# Patient Record
Sex: FEMALE | Race: BLACK OR AFRICAN AMERICAN | NOT HISPANIC OR LATINO | URBAN - METROPOLITAN AREA
[De-identification: names, ages, dates, MRNs, and addresses within clinical notes are randomized per-mention and may not be internally consistent; named-entity substitution may affect disease eponyms.]

---

## 2024-10-22 ENCOUNTER — EMERGENCY (EMERGENCY)
Facility: HOSPITAL | Age: 24
LOS: 1 days | Discharge: HOME | End: 2024-10-22
Admitting: STUDENT IN AN ORGANIZED HEALTH CARE EDUCATION/TRAINING PROGRAM
Payer: COMMERCIAL

## 2024-10-22 VITALS
RESPIRATION RATE: 18 BRPM | DIASTOLIC BLOOD PRESSURE: 74 MMHG | SYSTOLIC BLOOD PRESSURE: 131 MMHG | HEART RATE: 81 BPM | OXYGEN SATURATION: 98 %

## 2024-10-22 VITALS
TEMPERATURE: 98 F | HEIGHT: 65 IN | WEIGHT: 149.91 LBS | SYSTOLIC BLOOD PRESSURE: 125 MMHG | DIASTOLIC BLOOD PRESSURE: 78 MMHG | OXYGEN SATURATION: 98 % | RESPIRATION RATE: 16 BRPM | HEART RATE: 79 BPM

## 2024-10-22 PROCEDURE — 99283 EMERGENCY DEPT VISIT LOW MDM: CPT

## 2024-10-22 PROCEDURE — 99285 EMERGENCY DEPT VISIT HI MDM: CPT

## 2024-10-22 RX ADMIN — Medication 400 MILLIGRAM(S): at 14:31

## 2024-10-22 NOTE — ED PROVIDER NOTE - NSFOLLOWUPINSTRUCTIONS_ED_ALL_ED_FT
You can use ibuprofen if needed for pain, take as directed.  Follow up with Employee Health.  =============================  Burn Care, Adult  A hand with a reddened and blistered area from a burn.  A burn is an injury to the skin or the tissues under the skin. It may be caused by a fire, hot liquid or steam, chemicals, electricity, or the sun. There are three types of burns:  First degree. These burns are similar to a sunburn. They may cause your skin to be red, slightly swollen, and tender. They may be treated at home.  Second degree. These burns are very painful. They may cause your skin to turn very red, swell, leak fluid, look shiny, and blister. In many cases, these burns may be treated at home. If they cover your hands, feet, face, or genitals, get help from a health care provider.  Third degree. These burns are the most severe. They may not be painful, but you may feel pain around the edges of them. Your skin may turn white or black and may look charred, dry, and leathery. These burns cause lasting damage. If you get a third-degree burn, get help right away.  Treatment will depend on the type of burn you have. Taking care of your burn can help to prevent pain and infection. It can also help the burn heal more quickly.    How to care for a first-degree burn  Right after the burn:    Rinse or soak the burn under cool water for 5 minutes or more.  Put a cool, wet cloth (cool compress) on your skin. This may help with pain.  Do not put ice on your burn. This can cause more damage.  Caring for the burn    Clean and care for the burn as told by your provider. You may be told to:  Use mild soap and water to clean the area.  Use a clean cloth to pat the burned area dry after cleaning it. Do not rub or scrub the burn.  Put lotion or aloe vera gel on your skin.  How to care for a second-degree burn  Right after the burn:    Rinse or soak the burn under cool water. Do this for 5–10 minutes.  Do not put ice on your burn. This can cause more damage.  Take off any jewelry or clothing near the burn.  Lightly cover the burn with a clean cloth.  Caring for the burn    Clean and care for the burn as told by your provider. You may be told to:  Clean or rinse out the burned area.  Put a cream or ointment on the burn. You may need to use an antibiotic cream that has silver in it. This can kill bacteria.  Place a germ-free (sterile) dressing over the burn. A dressing is a bandage that is put over a burn to help it heal.  Raise (elevate) the injured area above the level of your heart while you are sitting or lying down.  How to care for a third-degree burn  Right after the burn:    Lightly cover the burn with a clean, dry cloth.  Get help right away. You may need to:  Stay in the hospital.  Have surgery to remove burned tissue or get a skin graft.  Get fluids through an IV.  Caring for the burn    Clean and care for the burn as told by your provider. You may be told to:  Clean or rinse out the burn.  Put a cream or ointment on the burn.  Put a sterile dressing in the burned area (packing).  Put a sterile dressing over the burn.  Use pressure (compression) dressings.  Elevate the injured area above the level of your heart while you are sitting or lying down.  Wear splints or immobilizers as told by your provider.  Do exercises as told by your provider.  Rest as told by your provider. Do not do sports or other physical activities until your provider says that you can.  How to prevent infection when caring for a burn  Washing hands with soap and water.  Take these steps to prevent infection and more damage to the tissue. Make sure you:  Wash your hands with soap and water for at least 20 seconds before and after you care for your burn. If soap and water are not available, use hand .  Wear clean gloves as told by your provider.  Do not put butter, oil, toothpaste, or other home remedies on the burn.  Do not scratch or pick at the burn.  Do not break any blisters.  Do not peel the skin.  Do not rub your burn, even when cleaning it.  Check your burn every day for signs of infection. Check for:  More redness, swelling, or pain.  Warmth.  Pus or a bad smell.  Red streaks around the burn.  Follow these instructions at home  Medicines    Take over-the-counter and prescription medicines only as told by your provider.  If you were prescribed antibiotics, take or apply them as told by your provider. Do not stop using the antibiotic even if you start to feel better.  General instructions    Do not use any products that contain nicotine or tobacco. These products include cigarettes, chewing tobacco, and vaping devices, such as e-cigarettes. If you need help quitting, ask your provider.  Drink enough fluid to keep your pee (urine) pale yellow.  Protect your burn from the sun.  Contact a health care provider if:  Your burn does not get better, or it gets worse.  You have any signs of infection.  Your burn starts to look different or gets black or red spots.  Your pain does not get better with medicine.  You have anxiety or depression after the injury.  Get help right away if:  You have red streaks near the burn.  You are in severe pain.  This information is not intended to replace advice given to you by your health care provider. Make sure you discuss any questions you have with your health care provider.

## 2024-10-22 NOTE — ED PROVIDER NOTE - PHYSICAL EXAMINATION
CONSTITUTIONAL: NAD   SKIN: Normal color and turgor.  No visible erythema, blistering, or other abnormality of affected area (left buttock, posterior thigh).  Chaperoned by ALYSSA Mcbride.  HEAD: NC/AT.  EYES: Conjunctiva clear. Anicteric sclera.  ENT: Airway clear. Normal voice. MMM.  RESPIRATORY:  Normal respiratory rate and effort.  CARDIOVASCULAR:  RRR   GI:  Abdomen soft, nontender.    MSK: Neck supple.  No LE edema or calf tenderness. No joint swelling or ROM limitation.  NEURO: Alert, clear mental status.  Speech clear. No focal deficits. Gait steady.

## 2024-10-22 NOTE — ED ADULT TRIAGE NOTE - CHIEF COMPLAINT QUOTE
employee here in the kitchen, c/o left buttocks burn from hot salmon oil accidentally spilled on her just PTA.

## 2024-10-22 NOTE — ED PROVIDER NOTE - CLINICAL SUMMARY MEDICAL DECISION MAKING FREE TEXT BOX
Pt reports hot salmon oil burn to left buttock, occurred 1-2 hrs ago.   Well-appearing, has no signs of burn or other abnormality on exam, clothing intact.  Not pregnant.   PLan; pain control; follow up Employee Health.

## 2024-10-22 NOTE — ED PROVIDER NOTE - PATIENT PORTAL LINK FT
You can access the FollowMyHealth Patient Portal offered by [facility] by registering at the following website: [email or web address]. By joining [employer]’s FollowMyHealth portal, you will also be able to view your health information using other applications (apps) compatible with our system.

## 2024-10-22 NOTE — ED ADULT NURSE NOTE - AVIAN FLU SYMPTOMS
Lab Frequency Next Occurrence   CBC WITH DIFFERENTIAL WITH PLATELET Once 07/71/1977   IRON AND TIBC Once 05/19/2023     Letter mailed to patient with reminder of overdue testing
No
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
## 2024-10-24 DIAGNOSIS — T21.25XA BURN OF SECOND DEGREE OF BUTTOCK, INITIAL ENCOUNTER: ICD-10-CM

## 2024-10-24 DIAGNOSIS — T31.0 BURNS INVOLVING LESS THAN 10% OF BODY SURFACE: ICD-10-CM

## 2024-10-24 DIAGNOSIS — Z91.013 ALLERGY TO SEAFOOD: ICD-10-CM
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
## 2025-02-03 ENCOUNTER — EMERGENCY (EMERGENCY)
Facility: HOSPITAL | Age: 25
LOS: 1 days | Discharge: HOME | End: 2025-02-03
Admitting: EMERGENCY MEDICINE
Payer: COMMERCIAL

## 2025-02-03 VITALS
HEART RATE: 77 BPM | DIASTOLIC BLOOD PRESSURE: 78 MMHG | OXYGEN SATURATION: 98 % | RESPIRATION RATE: 18 BRPM | HEIGHT: 65 IN | TEMPERATURE: 98 F | SYSTOLIC BLOOD PRESSURE: 115 MMHG | WEIGHT: 149.91 LBS

## 2025-02-03 PROCEDURE — 99283 EMERGENCY DEPT VISIT LOW MDM: CPT

## 2025-02-03 RX ORDER — ACETAMINOPHEN 160 MG/5ML
2 SUSPENSION ORAL
Qty: 12 | Refills: 0
Start: 2025-02-03 | End: 2025-02-05

## 2025-02-03 RX ORDER — AMOXICILLIN AND CLAVULANATE POTASSIUM 200; 28.5 MG/5ML; MG/5ML
875 POWDER, FOR SUSPENSION ORAL
Qty: 14 | Refills: 0
Start: 2025-02-03 | End: 2025-02-09

## 2025-02-03 RX ORDER — AMOXICILLIN AND CLAVULANATE POTASSIUM 200; 28.5 MG/5ML; MG/5ML
1 POWDER, FOR SUSPENSION ORAL ONCE
Refills: 0 | Status: COMPLETED | OUTPATIENT
Start: 2025-02-03 | End: 2025-02-03

## 2025-02-03 RX ORDER — ACETAMINOPHEN 160 MG/5ML
650 SUSPENSION ORAL ONCE
Refills: 0 | Status: COMPLETED | OUTPATIENT
Start: 2025-02-03 | End: 2025-02-03

## 2025-02-03 RX ORDER — AMOXICILLIN AND CLAVULANATE POTASSIUM 200; 28.5 MG/5ML; MG/5ML
875 POWDER, FOR SUSPENSION ORAL
Qty: 14 | Refills: 0
Start: 2025-02-03 | End: 2025-02-14

## 2025-02-03 RX ADMIN — ACETAMINOPHEN 650 MILLIGRAM(S): 160 SUSPENSION ORAL at 12:19

## 2025-02-03 RX ADMIN — AMOXICILLIN AND CLAVULANATE POTASSIUM 1 TABLET(S): 200; 28.5 POWDER, FOR SUSPENSION ORAL at 12:19

## 2025-02-03 NOTE — ED ADULT NURSE NOTE - NS ED NURSE LEVEL OF CONSCIOUSNESS SPEECH
Speaking Coherently Consent was obtained from the patient. The risks and benefits to therapy were discussed in detail. Specifically, the risks of infection, scarring, bleeding, prolonged wound healing, incomplete removal, allergy to anesthesia, nerve injury and recurrence were addressed. Prior to the procedure, the treatment site was clearly identified and confirmed by the patient. All components of Universal Protocol/PAUSE Rule completed.

## 2025-02-03 NOTE — ED PROVIDER NOTE - OBJECTIVE STATEMENT
Patient is a 24-year-old female, presents to ED complaining of throat pain x 2 days.  Patient states she has a history of strep pharyngitis, and tonsillitis, and states that she is having pain in her lymph nodes.  Patient also admits to a slight sore throat.  Patient denies fever, nausea, vomiting, shortness of breath or cough.

## 2025-02-03 NOTE — ED ADULT TRIAGE NOTE - CHIEF COMPLAINT QUOTE
Pt presents to ED her e for throat pain x 2 days. Denies fever or chills. Pt &Ox4, NAD and conversive in full sentences.

## 2025-02-03 NOTE — ED PROVIDER NOTE - NSFOLLOWUPINSTRUCTIONS_ED_ALL_ED_FT
Strep Throat    WHAT YOU NEED TO KNOW:    What is strep throat? Strep throat is a throat infection caused by bacteria. The bacteria are easily spread from person to person.    What are the signs and symptoms of strep throat?    Sore, red, and swollen throat    Fever and headache    Upset stomach, abdominal pain, or vomiting    White or yellow patches or blisters in the back of your throat    Tender, swollen lumps on the sides of your neck or jaw    Throat pain when you swallow  Strep Throat  How is strep throat diagnosed? Your healthcare provider will swab the back of your throat to test for strep bacteria. You may get the results in minutes or the swab may be sent to a lab.    How is strep throat treated? Treatment may include any of the following:    Antibiotics treat the infection. You should feel better within 2 to 3 days after you start antibiotics. Take all prescribed doses, even if you start to feel better sooner. You may return to work or school 24 hours after you start antibiotics.    NSAIDs help decrease swelling and pain or fever. This medicine is available with or without a doctor's order. NSAIDs can cause stomach bleeding or kidney problems in certain people. If you take blood thinner medicine, always ask your healthcare provider if NSAIDs are safe for you. Always read the medicine label and follow directions.    Acetaminophen decreases pain and fever. It is available without a doctor's order. Ask how much to take and how often to take it. Follow directions. Read the labels of all other medicines you are using to see if they also contain acetaminophen, or ask your doctor or pharmacist. Acetaminophen can cause liver damage if not taken correctly.  How can I manage my symptoms?    Use throat lozenges or suck on hard candy. Lozenges and hard candy can help decrease throat pain.    Drink plenty of liquids. Liquids will help soothe your throat. Ask your healthcare provider how much liquid to drink each day. Warm liquids include hot chocolate, tea, and soup. Frozen liquids include ice pops. Do not have acidic drinks such as orange juice, grapefruit juice, or lemonade. Acidic drinks can make your throat pain worse.    Gargle with salt water up to 4 times each day. Put ¼ teaspoon of salt in 1 cup of warm water. Gargle the salt water for 10 to 15 seconds. Then spit it out. Do not swallow the salt water.    Do not smoke. Nicotine and other chemicals in cigarettes and cigars can cause lung damage and make your symptoms worse. Ask your healthcare provider for information if you currently smoke and need help to quit. E-cigarettes or smokeless tobacco still contain nicotine. Talk to your healthcare provider before you use these products.  How do I prevent strep throat?    Wash your hands often. Use soap and water. Wash your hands after you use the bathroom, change a child's diapers, or sneeze. Wash your hands before you prepare or eat food. Use an alcohol-based hand  if soap and water are not available.  Handwashing      Do not share food or drinks. Strep bacteria can stay on dishes and in straws for 24 hours or longer. You may pass the bacteria to others by sharing.    Replace your toothbrush 24 hours after you start antibiotics. Strep bacteria can stay on a toothbrush for several days. You may get strep throat again or infect someone else if the toothbrush is not replaced.  Call your local emergency number (911 in the ) if:    You have trouble breathing.    When should I seek immediate care?    You have new symptoms like a bad headache, stiff neck, or vomiting.    You are drooling because you cannot swallow.  When should I call my doctor?    You have a fever.    You have a rash or ear pain.    You have green, yellow-brown, or bloody mucus when you cough or blow your nose.    You are not able to drink anything.    You have questions or concerns about your condition or care.  CARE AGREEMENT:    You have the right to help plan your care. Learn about your health condition and how it may be treated. Discuss treatment options with your healthcare providers to decide what care you want to receive. You always have the right to refuse treatment.    © [employer] 1973, 2025

## 2025-02-03 NOTE — ED PROVIDER NOTE - PATIENT PORTAL LINK FT
You can access the FollowMyHealth Patient Portal offered by [facility] by registering at the following website: [email or web address]. By joining [employer]’s FollowMyHealth portal, you will also be able to view your health information using other applications (apps) compatible with our system.

## 2025-02-03 NOTE — ED PROVIDER NOTE - CLINICAL SUMMARY MEDICAL DECISION MAKING FREE TEXT BOX
Patient is a 24-year-old female, who presented to ED complaining of a sore throat timesx 2 days.  Patient was seen and examined and had cervical lymphadenopathy, and a sore throat representative of strep pharyngitis.  Patient was prescribed Augmentin and Tylenol and instructed to follow-up with her primary care provider tomorrow for further evaluation and management.

## 2025-02-05 DIAGNOSIS — R07.0 PAIN IN THROAT: ICD-10-CM

## 2025-02-05 DIAGNOSIS — J02.0 STREPTOCOCCAL PHARYNGITIS: ICD-10-CM

## 2025-02-05 DIAGNOSIS — R59.0 LOCALIZED ENLARGED LYMPH NODES: ICD-10-CM

## 2025-02-08 ENCOUNTER — EMERGENCY (EMERGENCY)
Facility: HOSPITAL | Age: 25
LOS: 1 days | Discharge: HOME | End: 2025-02-08
Admitting: EMERGENCY MEDICINE
Payer: COMMERCIAL

## 2025-02-08 VITALS
SYSTOLIC BLOOD PRESSURE: 111 MMHG | OXYGEN SATURATION: 100 % | RESPIRATION RATE: 18 BRPM | TEMPERATURE: 97 F | HEART RATE: 72 BPM | HEIGHT: 65 IN | WEIGHT: 149.91 LBS | DIASTOLIC BLOOD PRESSURE: 74 MMHG

## 2025-02-08 PROCEDURE — 99283 EMERGENCY DEPT VISIT LOW MDM: CPT

## 2025-02-08 NOTE — ED PROVIDER NOTE - OBJECTIVE STATEMENT
25 y/o female w/ no sig pmh p/w sore throat and swollen lymph nodes x approx 1 wk.  Was seen for same on 2/3/25 and prescribed augmentin, but did not pick it up.  States sx mostly the same, but slightly worse.  Denies f/c, cough, neck stiffness, cp, sob, n/v/d.  Has hx tonsillitis in past.

## 2025-02-08 NOTE — ED ADULT NURSE NOTE - SUICIDE SCREENING QUESTION 2
"Chief Complaint   Patient presents with     Leg Swelling     left leg/foot swelling with pain for two days - reports hx of blood clot in leg leg - currently taking Warfrin.       Initial /75  Pulse 72  Temp 97.1  F (36.2  C) (Oral)  Wt 176 lb (79.8 kg)  BMI 31.68 kg/m2 Estimated body mass index is 31.68 kg/(m^2) as calculated from the following:    Height as of 4/27/17: 5' 2.5\" (1.588 m).    Weight as of this encounter: 176 lb (79.8 kg).  Medication Reconciliation: complete    "
No

## 2025-02-08 NOTE — ED ADULT NURSE NOTE - OBJECTIVE STATEMENT
The patient is a 24y Female complaining of  throat pain and swollen lymph nodes x 1 week, seen recently at [facility] ED for similar cc. Pt states pain is worse and radiating to the back of her throat. Pt denies SOB, F/C, N/V, trouble swallowing.

## 2025-02-08 NOTE — ED PROVIDER NOTE - NSFOLLOWUPINSTRUCTIONS_ED_ALL_ED_FT
Thank you for visiting [facility] Emergency Department.      We saw you today for sore throat.  Please take antibiotics as prescribed.    PAIN CONTROL:   You may take ibuprofen (Motrin, Advil) 600 mg (3 regular tablets) every 6 hours as needed for pain.  Please take with food.  Stop taking if you develop abdominal pain, dark/ bloody stools.  Do not mix with other NSAIDS (ie. Naproxen, Aleve, Celecoxib).  You may also take acetaminophen (Tylenol) 650-975mg (2-3 regular tablets) or 500-1000mg (1-2 extra strength tablets) every 6 hours as needed for pain.  Do not exceed 4000 mg in 1 day. These medications may be bought over the counter.    If your pain does not improve or worsens despite these measures, you should seek medical attention and/or may need to follow up with an ENT specialist.    Please know that no emergency visit is complete without follow-up with your primary care provider in 1 week.  Please bring copies of all discharge papers and results and show to your doctor.      Please continue taking all previous medications as instructed unless we discussed otherwise.     I appreciated your patience and hope you feel better soon.     Return to ER immediately if you develop fevers, chills, chest pain, shortness of breath, worsening and/or any concerning symptoms.

## 2025-02-08 NOTE — ED PROVIDER NOTE - PHYSICAL EXAMINATION
CONSTITUTIONAL: Awake, alert.  Nontoxic, no acute distress.    HEAD: Normocephalic, atraumatic.    EYES: Conjunctivae clear without exudates or hemorrhage. Sclera is non-icteric.    ENT:   Ears: External ear normal appearing without tenderness, ear canal clear without discharge or erythema, TM normal in appearance with normal landmarks and cone of light.  No mastoid tenderness, swelling, erythema.  Nose: Normal appearing nose, nasal mucosa is pink and moist. The nasal septum is midline, no septal hematoma. Nares are patent bilaterally.  Throat: Oral mucosa is pink and moist with good dentition. Tongue normal in appearance without lesions and with good symmetrical movement. No buccal nodules or lesions are noted. The pharynx is normal in appearance without tonsillar swelling or exudates.  Uvula midline.  No trismus.  Minimal submandibular lymphadenopathy     NECK: supple, trachea midline. FROM     HEART:  Normal rate, regular rhythm.      LUNGS:  No acute respiratory distress.  Non-tachypneic and non-labored.      MUSCULOSKELETAL:  Moving all extremities without issue.    SKIN: Skin in warm, dry and intact without rashes or lesions.  Appropriate color for ethnicity.    NEUROLOGICAL:  Patient is alert, oriented x person, place and time.    PSYCH: Appropriate mood and affect. Good judgment and insight.

## 2025-02-08 NOTE — ED ADULT TRIAGE NOTE - CHIEF COMPLAINT QUOTE
Pt presents to ED c.o worsening sore throat and swollen l7ymph nodes, pt was seen here in the ED for the CC days ago and was advised for tonsillectomy. Pt A&Ox4, NAD and conversive in full sentences and ambulatory. No airway compromise.

## 2025-02-08 NOTE — ED PROVIDER NOTE - PATIENT PORTAL LINK FT
You can access the FollowMyHealth Patient Portal offered by [facility] by registering at the following website: [email or web address]. By joining [employer]’s FollowMyHealth portal, you will also be able to view your health information using other applications (apps) compatible with our system.

## 2025-02-08 NOTE — ED PROVIDER NOTE - CLINICAL SUMMARY MEDICAL DECISION MAKING FREE TEXT BOX
25 y/o female w/ no sig pmh p/w sore throat and swollen lymph nodes x approx 1 wk.  Was seen for same on 2/3/25 and prescribed augmentin, but did not pick it up.  States sx mostly the same, but slightly worse.  Denies f/c, cough, neck stiffness, cp, sob, n/v/d.  Has hx tonsillitis in past.  VSS  Exam grossly reassuring. minimal submandibular lymphadenopathy on exam  Suspect most likely viral pharyngitis, but was dx with presumed strep on 2/3, but did not  abx.  will resend/ advise pt to take.  supportive care.  close f/u with pmd and ent

## 2025-02-11 DIAGNOSIS — J02.9 ACUTE PHARYNGITIS, UNSPECIFIED: ICD-10-CM

## 2025-02-11 DIAGNOSIS — Z87.09 PERSONAL HISTORY OF OTHER DISEASES OF THE RESPIRATORY SYSTEM: ICD-10-CM

## 2025-02-11 DIAGNOSIS — R59.0 LOCALIZED ENLARGED LYMPH NODES: ICD-10-CM

## 2025-02-11 DIAGNOSIS — Z91.013 ALLERGY TO SEAFOOD: ICD-10-CM

## 2025-03-05 ENCOUNTER — EMERGENCY (EMERGENCY)
Facility: HOSPITAL | Age: 25
LOS: 1 days | Discharge: HOME | End: 2025-03-05
Admitting: EMERGENCY MEDICINE
Payer: COMMERCIAL

## 2025-03-05 VITALS
DIASTOLIC BLOOD PRESSURE: 76 MMHG | RESPIRATION RATE: 18 BRPM | TEMPERATURE: 98 F | WEIGHT: 154.98 LBS | OXYGEN SATURATION: 100 % | HEIGHT: 65 IN | SYSTOLIC BLOOD PRESSURE: 115 MMHG | HEART RATE: 66 BPM

## 2025-03-05 VITALS
HEART RATE: 73 BPM | RESPIRATION RATE: 18 BRPM | DIASTOLIC BLOOD PRESSURE: 83 MMHG | SYSTOLIC BLOOD PRESSURE: 123 MMHG | TEMPERATURE: 99 F | OXYGEN SATURATION: 100 %

## 2025-03-05 LAB
FLUAV AG NPH QL: SIGNIFICANT CHANGE UP
FLUBV AG NPH QL: SIGNIFICANT CHANGE UP
RSV RNA NPH QL NAA+NON-PROBE: SIGNIFICANT CHANGE UP
S PYO AG SPEC QL IA: NEGATIVE — SIGNIFICANT CHANGE UP
SARS-COV-2 RNA SPEC QL NAA+PROBE: SIGNIFICANT CHANGE UP

## 2025-03-05 PROCEDURE — 87081 CULTURE SCREEN ONLY: CPT

## 2025-03-05 PROCEDURE — 87880 STREP A ASSAY W/OPTIC: CPT

## 2025-03-05 PROCEDURE — 99284 EMERGENCY DEPT VISIT MOD MDM: CPT

## 2025-03-05 PROCEDURE — 87637 SARSCOV2&INF A&B&RSV AMP PRB: CPT

## 2025-03-05 PROCEDURE — 99283 EMERGENCY DEPT VISIT LOW MDM: CPT

## 2025-03-07 DIAGNOSIS — Z91.013 ALLERGY TO SEAFOOD: ICD-10-CM

## 2025-03-07 DIAGNOSIS — J06.9 ACUTE UPPER RESPIRATORY INFECTION, UNSPECIFIED: ICD-10-CM

## 2025-03-07 DIAGNOSIS — B97.89 OTHER VIRAL AGENTS AS THE CAUSE OF DISEASES CLASSIFIED ELSEWHERE: ICD-10-CM

## 2025-03-07 DIAGNOSIS — R05.8 OTHER SPECIFIED COUGH: ICD-10-CM

## 2025-03-25 PROBLEM — Z00.00 ENCOUNTER FOR PREVENTIVE HEALTH EXAMINATION: Status: ACTIVE | Noted: 2025-03-25

## 2025-03-27 ENCOUNTER — APPOINTMENT (OUTPATIENT)
Dept: INTERNAL MEDICINE | Facility: CLINIC | Age: 25
End: 2025-03-27

## 2025-04-08 ENCOUNTER — APPOINTMENT (OUTPATIENT)
Dept: INTERNAL MEDICINE | Facility: CLINIC | Age: 25
End: 2025-04-08
Payer: COMMERCIAL

## 2025-04-08 ENCOUNTER — OUTPATIENT (OUTPATIENT)
Dept: OUTPATIENT SERVICES | Facility: HOSPITAL | Age: 25
LOS: 1 days | End: 2025-04-08

## 2025-04-08 VITALS
BODY MASS INDEX: 27.32 KG/M2 | HEART RATE: 78 BPM | OXYGEN SATURATION: 98 % | WEIGHT: 164 LBS | DIASTOLIC BLOOD PRESSURE: 73 MMHG | SYSTOLIC BLOOD PRESSURE: 117 MMHG | HEIGHT: 65 IN | TEMPERATURE: 97.5 F

## 2025-04-08 DIAGNOSIS — Z87.09 PERSONAL HISTORY OF OTHER DISEASES OF THE RESPIRATORY SYSTEM: ICD-10-CM

## 2025-04-08 DIAGNOSIS — Z12.4 ENCOUNTER FOR SCREENING FOR MALIGNANT NEOPLASM OF CERVIX: ICD-10-CM

## 2025-04-08 DIAGNOSIS — Z00.00 ENCOUNTER FOR GENERAL ADULT MEDICAL EXAMINATION W/OUT ABNORMAL FINDINGS: ICD-10-CM

## 2025-04-08 DIAGNOSIS — J45.909 UNSPECIFIED ASTHMA, UNCOMPLICATED: ICD-10-CM

## 2025-04-08 DIAGNOSIS — R07.89 OTHER CHEST PAIN: ICD-10-CM

## 2025-04-08 DIAGNOSIS — J02.9 ACUTE PHARYNGITIS, UNSPECIFIED: ICD-10-CM

## 2025-04-08 PROCEDURE — G2211 COMPLEX E/M VISIT ADD ON: CPT

## 2025-04-08 PROCEDURE — 99204 OFFICE O/P NEW MOD 45 MIN: CPT

## 2025-04-08 RX ORDER — ALBUTEROL 90 MCG
90 AEROSOL (GRAM) INHALATION
Refills: 0 | Status: ACTIVE | COMMUNITY

## 2025-06-02 ENCOUNTER — EMERGENCY (EMERGENCY)
Facility: HOSPITAL | Age: 25
LOS: 1 days | End: 2025-06-02
Attending: STUDENT IN AN ORGANIZED HEALTH CARE EDUCATION/TRAINING PROGRAM | Admitting: STUDENT IN AN ORGANIZED HEALTH CARE EDUCATION/TRAINING PROGRAM
Payer: COMMERCIAL

## 2025-06-02 VITALS
SYSTOLIC BLOOD PRESSURE: 116 MMHG | WEIGHT: 164.91 LBS | HEART RATE: 76 BPM | OXYGEN SATURATION: 99 % | TEMPERATURE: 99 F | DIASTOLIC BLOOD PRESSURE: 78 MMHG | HEIGHT: 65 IN | RESPIRATION RATE: 16 BRPM

## 2025-06-02 DIAGNOSIS — Z91.013 ALLERGY TO SEAFOOD: ICD-10-CM

## 2025-06-02 DIAGNOSIS — M79.672 PAIN IN LEFT FOOT: ICD-10-CM

## 2025-06-02 PROCEDURE — 73620 X-RAY EXAM OF FOOT: CPT

## 2025-06-02 PROCEDURE — 99284 EMERGENCY DEPT VISIT MOD MDM: CPT

## 2025-06-02 PROCEDURE — 73620 X-RAY EXAM OF FOOT: CPT | Mod: 26,LT

## 2025-06-02 PROCEDURE — 99283 EMERGENCY DEPT VISIT LOW MDM: CPT | Mod: 25

## 2025-06-02 RX ORDER — IBUPROFEN 200 MG
400 TABLET ORAL ONCE
Refills: 0 | Status: COMPLETED | OUTPATIENT
Start: 2025-06-02 | End: 2025-06-02

## 2025-06-02 RX ADMIN — Medication 400 MILLIGRAM(S): at 14:06

## 2025-06-02 NOTE — ED PROVIDER NOTE - CARE PROVIDER_API CALL
Bashir Monterroso  Podiatric Medicine and Surgery  [address]  [place] [address]  Phone: [phone]  Fax: [phone]  Follow Up Time: 7-10 Days

## 2025-06-02 NOTE — ED PROVIDER NOTE - NSFOLLOWUPINSTRUCTIONS_ED_ALL_ED_FT
Please take Motrin as needed for discomfort perform gentle stretching and massage.  Please use shoes with good arch support and cushioning.  Please follow-up with the podiatrist if you have chronic or worsening issues.  Please come back to emergency room for any emergent concerns.  Your x-ray did not reveal any fractures or bony injuries.

## 2025-06-02 NOTE — ED PROVIDER NOTE - OBJECTIVE STATEMENT
Patient is a 4-year-old female no significant past medical history present emergency room with left foot pain.  Patient states for the past month intermittent pain near the top of the metatarsals.  Denies any injury twisting fevers wounds.  Worse with walking feels that she has been overusing her feet.  Denies any prior occurrences.  Concerned because the pain is so prolonged.

## 2025-06-02 NOTE — ED ADULT TRIAGE NOTE - CHIEF COMPLAINT QUOTE
L foot pain x "several weeks" without obvious initiating injury or trauma. Alert, oriented, and ambulatory without difficulty at time of triage. No further injury reported within the last 24 hours. No further symptoms reported.

## 2025-06-02 NOTE — ED PROVIDER NOTE - MUSCULOSKELETAL, MLM
Left foot minimal tenderness to palpation at the head of the third metatarsal no overlying skin changes intact pulses warm extremity no wounds or lacerations

## 2025-06-02 NOTE — ED PROVIDER NOTE - CLINICAL SUMMARY MEDICAL DECISION MAKING FREE TEXT BOX
Patient present Emergency Department with subacute left-sided foot pain.  Patient well-appearing in no acute distress no overlying skin changes no significant focal bony tenderness low suspicion for fracture or acute bony injury likely strain or ligamentous sprain.  Will treat discomfort obtain x-ray.  If reassuring can likely discharge patient home with instructions to follow-up with podiatry.

## 2025-06-02 NOTE — ED ADULT NURSE NOTE - CAS ELECT INFOMATION PROVIDED
Impression: Presence of intraocular lens: Z96.1. Plan: Lenses are doing well.   Will continue to monitor yearly for now
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 Plan: Retinas are both doing well.   RTC if any changes noted
DC instructions

## 2025-06-19 ENCOUNTER — EMERGENCY (EMERGENCY)
Facility: HOSPITAL | Age: 25
LOS: 1 days | End: 2025-06-19
Admitting: STUDENT IN AN ORGANIZED HEALTH CARE EDUCATION/TRAINING PROGRAM
Payer: COMMERCIAL

## 2025-06-19 VITALS
SYSTOLIC BLOOD PRESSURE: 113 MMHG | HEIGHT: 65 IN | RESPIRATION RATE: 17 BRPM | HEART RATE: 86 BPM | WEIGHT: 164.91 LBS | TEMPERATURE: 98 F | DIASTOLIC BLOOD PRESSURE: 65 MMHG | OXYGEN SATURATION: 98 %

## 2025-06-19 DIAGNOSIS — M25.511 PAIN IN RIGHT SHOULDER: ICD-10-CM

## 2025-06-19 DIAGNOSIS — Z91.013 ALLERGY TO SEAFOOD: ICD-10-CM
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
## 2025-06-19 PROCEDURE — 73030 X-RAY EXAM OF SHOULDER: CPT | Mod: 26,RT

## 2025-06-19 PROCEDURE — 99283 EMERGENCY DEPT VISIT LOW MDM: CPT | Mod: 25

## 2025-06-19 PROCEDURE — 99284 EMERGENCY DEPT VISIT MOD MDM: CPT

## 2025-06-19 PROCEDURE — 73030 X-RAY EXAM OF SHOULDER: CPT

## 2025-06-19 RX ORDER — IBUPROFEN 200 MG
600 TABLET ORAL ONCE
Refills: 0 | Status: COMPLETED | OUTPATIENT
Start: 2025-06-19 | End: 2025-06-19

## 2025-06-19 RX ADMIN — Medication 600 MILLIGRAM(S): at 10:23

## 2025-07-12 ENCOUNTER — EMERGENCY (EMERGENCY)
Facility: HOSPITAL | Age: 25
LOS: 1 days | End: 2025-07-12
Admitting: STUDENT IN AN ORGANIZED HEALTH CARE EDUCATION/TRAINING PROGRAM
Payer: COMMERCIAL

## 2025-07-12 VITALS
SYSTOLIC BLOOD PRESSURE: 150 MMHG | RESPIRATION RATE: 17 BRPM | TEMPERATURE: 98 F | OXYGEN SATURATION: 99 % | HEIGHT: 65 IN | DIASTOLIC BLOOD PRESSURE: 95 MMHG | HEART RATE: 68 BPM

## 2025-07-12 LAB
FLUAV AG NPH QL: SIGNIFICANT CHANGE UP
FLUBV AG NPH QL: SIGNIFICANT CHANGE UP
RSV RNA NPH QL NAA+NON-PROBE: SIGNIFICANT CHANGE UP
S PYO AG SPEC QL IA: NEGATIVE — SIGNIFICANT CHANGE UP
SARS-COV-2 RNA SPEC QL NAA+PROBE: SIGNIFICANT CHANGE UP
SOURCE RESPIRATORY: SIGNIFICANT CHANGE UP

## 2025-07-12 PROCEDURE — 87880 STREP A ASSAY W/OPTIC: CPT

## 2025-07-12 PROCEDURE — 87081 CULTURE SCREEN ONLY: CPT

## 2025-07-12 PROCEDURE — 99283 EMERGENCY DEPT VISIT LOW MDM: CPT

## 2025-07-12 PROCEDURE — 87637 SARSCOV2&INF A&B&RSV AMP PRB: CPT

## 2025-07-12 PROCEDURE — 99284 EMERGENCY DEPT VISIT MOD MDM: CPT

## 2025-07-12 NOTE — ED PROVIDER NOTE - PATIENT PORTAL LINK FT
You can access the FollowMyHealth Patient Portal offered by [facility] by registering at the following website: [email or web address]. By joining [employer]’s FollowMyHealth portal, you will also be able to view your health information using other applications (apps) compatible with our system.

## 2025-07-12 NOTE — ED ADULT NURSE NOTE - OBJECTIVE STATEMENT
Patient presents to the ED complaining of throat pain and nasal congestion. Denies any fever or chills. No cough.

## 2025-07-12 NOTE — ED PROVIDER NOTE - NSFOLLOWUPINSTRUCTIONS_ED_ALL_ED_FT
drink lots of fluids, eat a soft/easy to swallow diet, take tylenol or ibuprofen as needed, follow up with ent

## 2025-07-12 NOTE — ED ADULT NURSE NOTE - CHIEF COMPLAINT QUOTE
01/12/20 1400   Discharge disposition   Expected discharge disposition Home or Self   Name of [address] [facility]
+ throat pain, congestion, due for tonsillectomy   Requesting covid test

## 2025-07-12 NOTE — ED PROVIDER NOTE - ENMT, MLM
Airway patent, Nasal mucosa clear. Mouth with normal mucosa. Throat has no vesicles, no oropharyngeal exudates and uvula is midline. no erythema, no drooling, no voice changes, no cervical lymphadenopathy b/l, no trismus

## 2025-07-12 NOTE — ED PROVIDER NOTE - CLINICAL SUMMARY MEDICAL DECISION MAKING FREE TEXT BOX
pt requesting covid test - reports scratchy throat, well appearing, no resp distress, no drooling, no exudates or swelling - no clinical concern for PTA or epiglottitis, swab sent, symptom control and supportive care discussed, ent f/u advised, pt understands and agrees w/plan, strict return precautions given

## 2025-07-12 NOTE — ED PROVIDER NOTE - OBJECTIVE STATEMENT
The pt is a 25 y/ [employer] employee, who presents to ED requesting covid test- has had a scratchy throat x few d. Denies dysphagia, earache, cough, rash, fevers, chills.

## 2025-07-15 DIAGNOSIS — J02.9 ACUTE PHARYNGITIS, UNSPECIFIED: ICD-10-CM
